# Patient Record
Sex: MALE | Race: WHITE | Employment: FULL TIME | URBAN - METROPOLITAN AREA
[De-identification: names, ages, dates, MRNs, and addresses within clinical notes are randomized per-mention and may not be internally consistent; named-entity substitution may affect disease eponyms.]

---

## 2021-12-27 ENCOUNTER — HOSPITAL ENCOUNTER (EMERGENCY)
Age: 51
Discharge: HOME | End: 2021-12-27
Payer: MEDICARE

## 2021-12-27 VITALS
TEMPERATURE: 98 F | SYSTOLIC BLOOD PRESSURE: 118 MMHG | OXYGEN SATURATION: 96 % | BODY MASS INDEX: 22.03 KG/M2 | WEIGHT: 166.23 LBS | DIASTOLIC BLOOD PRESSURE: 79 MMHG | HEIGHT: 73 IN | HEART RATE: 88 BPM | RESPIRATION RATE: 16 BRPM

## 2021-12-27 DIAGNOSIS — B86 SCABIES: Primary | ICD-10-CM

## 2021-12-27 PROCEDURE — 99282 EMERGENCY DEPT VISIT SF MDM: CPT

## 2021-12-27 RX ORDER — PERMETHRIN 50 MG/G
CREAM TOPICAL
Qty: 60 G | Refills: 1 | Status: SHIPPED | OUTPATIENT
Start: 2021-12-27 | End: 2021-12-27 | Stop reason: SDUPTHER

## 2021-12-27 RX ORDER — PERMETHRIN 50 MG/G
CREAM TOPICAL
Qty: 60 G | Refills: 1 | Status: SHIPPED | OUTPATIENT
Start: 2021-12-27

## 2021-12-27 ASSESSMENT — PAIN SCALES - GENERAL: PAINLEVEL_OUTOF10: 0

## 2021-12-27 NOTE — ED PROVIDER NOTES
[address]        Pt Name: Livier Torrez  MRN: [id number]  [place] 1970  Date of evaluation: 12/27/2021  Provider: DAVINA Lauren      ADVANCED PRACTICE PROVIDER, I HAVE EVALUATED THIS PATIENT    CHIEF COMPLAINT     Rash on hands and feet      HISTORY OF PRESENT ILLNESS  (Location/Symptom, Timing/Onset, Context/Setting, Quality, Duration,Modifying Factors, Severity.)   Livier Torrez is a 46 y.o. male who presents to the emergencydepartment for rash on hands and feet that has been present for months. It itches. Thinks it is scabies because girlfriend has had it before. Reports rash and itching will occur in web spaces of fingers and also has a spot on the right index finger. Also has rash on the bilateral ankles at sock line and dorsal foot. Denies new soaps products or detergents. Denies fever chills nausea vomiting. Nursing Notes were reviewed and I agree. REVIEW OF SYSTEMS    (2-9 systems for level 4, 10 or more for level 5)   Review of Systems     Pertinent positives and negatives as per HPI. PAST MEDICAL HISTORY   No past medical history on file. SURGICAL HISTORY   No past surgical history on file. CURRENT MEDICATIONS       Previous Medications    No medications on file       ALLERGIES     Patient has no known allergies. FAMILY HISTORY     No family history on file. No family status information on file. SOCIAL HISTORY          PHYSICAL EXAM    (up to 7 for level 4, 8 or more for level 5)     ED Triage Vitals [12/27/21 1333]   BP Temp Temp Source Pulse Resp SpO2 Height Weight   118/79 98 °F (36.7 °C) Oral 88 16 96 % 6' 1\" (1.854 m) 166 lb 3.6 oz (75.4 kg)       Physical Exam  Constitutional:       General: He is not in acute distress. Appearance: Normal appearance. He is well-developed. He is not ill-appearing, toxic-appearing or diaphoretic.    HENT:      Head: Normocephalic and atraumatic. Pulmonary:      Effort: Pulmonary effort is normal. No respiratory distress. Musculoskeletal:      Cervical back: Normal range of motion and neck supple. Skin:     Comments: Multiple circular crusted erythematous areas on the bilateral ankles at the sock line and on the dorsal right foot. Also with similar areas on the hands. No obvious rash in web spaces of fingers. Right index finger has small circular area on the lateral aspect that is hypertrophied on the periphery and is open centrally. No purulent exudate    No petechiae, skin sloughing or ulcceration    No rash on the palms or soles   Neurological:      Mental Status: He is alert. Psychiatric:         Mood and Affect: Mood normal.         Behavior: Behavior normal.         Thought Content: Thought content normal.         Judgment: Judgment normal.         DIFFERENTIAL DIAGNOSIS   Scabies, tinea, SJS, syphilis, contact dermatitis, cellulitis, other    DIAGNOSTICRESULTS         RADIOLOGY:   Non-plain film images such as CT, Ultrasound and MRI are read by the radiologist. Plain radiographic images are visualized and preliminarily interpreted by DAVINA Brar with the below findings:      Interpretation per the Radiologist below, if available at the time of this note:    No orders to display         LABS:  Labs Reviewed - No data to display    All other labs were within normal range or not returned as of this dictation. EMERGENCY DEPARTMENT COURSE and DIFFERENTIALDIAGNOSIS/MDM:   Vitals:    Vitals:    12/27/21 1333   BP: 118/79   Pulse: 88   Resp: 16   Temp: 98 °F (36.7 °C)   TempSrc: Oral   SpO2: 96%   Weight: 166 lb 3.6 oz (75.4 kg)   Height: 6' 1\" (1.854 m)       Patient wasnontoxic, well appearing, afebrile with normal vital signs. Will treat for scabies with elimite. Instructed to  Wash all linens on hot and dry on hot. Have GF get treated too. FU with PCP in next few days for reeval and return for worsening.   He agreed and understood. PROCEDURES:  None    FINAL IMPRESSION      1. Scabies        DISPOSITION/PLAN   DISPOSITION Decision To Discharge 12/27/2021 04:33:11 PM      PATIENT REFERRED TO:  Sarita Elam  [phone]  Schedule an appointment as soon as possible for a visit in 2 days  for reevaluation    [facility]  [address] [place] [address]  [phone]    As needed, If symptoms worsen      DISCHARGE MEDICATIONS:  New Prescriptions    PERMETHRIN (ELIMITE) 5 % CREAM    Rub into skin from ears to toes, leave on for 12-14 hrs, then shower off.   Repeat in 2 weeks       (Please note that portions ofthis note were completed with a voice recognition program.  Efforts were made to edit the dictations but occasionally words are mis-transcribed.)    Mariela Paige, [address] [place] [address], [address] [place] [address]  12/27/21 1640